# Patient Record
Sex: MALE | Race: WHITE | NOT HISPANIC OR LATINO | URBAN - METROPOLITAN AREA
[De-identification: names, ages, dates, MRNs, and addresses within clinical notes are randomized per-mention and may not be internally consistent; named-entity substitution may affect disease eponyms.]

---

## 2019-01-20 ENCOUNTER — EMERGENCY (EMERGENCY)
Facility: HOSPITAL | Age: 49
LOS: 0 days | Discharge: HOME | End: 2019-01-20
Admitting: PHYSICIAN ASSISTANT

## 2019-01-20 VITALS
SYSTOLIC BLOOD PRESSURE: 138 MMHG | DIASTOLIC BLOOD PRESSURE: 91 MMHG | OXYGEN SATURATION: 97 % | TEMPERATURE: 97 F | HEART RATE: 100 BPM | RESPIRATION RATE: 18 BRPM

## 2019-01-20 DIAGNOSIS — F17.200 NICOTINE DEPENDENCE, UNSPECIFIED, UNCOMPLICATED: ICD-10-CM

## 2019-01-20 DIAGNOSIS — L02.91 CUTANEOUS ABSCESS, UNSPECIFIED: ICD-10-CM

## 2019-01-20 DIAGNOSIS — L98.9 DISORDER OF THE SKIN AND SUBCUTANEOUS TISSUE, UNSPECIFIED: ICD-10-CM

## 2019-01-20 NOTE — ED PROVIDER NOTE - OBJECTIVE STATEMENT
48 year old male smoker presents to the ED with a growth to the left side of his face. Patient initially noticed a mole to the side of his face that he pulled two hairs from. shortly after the area began to grow and swell gradually. Denies fevers, chills, pain, bleeding/drainage from the growth, previous injury or trauma to the area. 48 year old male smoker presents to the ED with a growth to the left side of his face for 8 months. Patient initially noticed a mole to the side of his face that he pulled two hairs from. shortly after the area began to grow and swell gradually. Denies fevers, chills, pain, bleeding/drainage from the growth, previous injury or trauma to the area.

## 2019-01-20 NOTE — ED PROVIDER NOTE - PHYSICAL EXAMINATION
VITALS:  I have reviewed the initial vital signs.  GENERAL: Well-developed, well-nourished, in no acute distress.  HEENT: quarter-sized spherical growth to left face just lateral to left orbit. no fluctuance, induration, erythema, bleeding, or drainage. Sclera clear. Mucous membranes moist.  NECK: supple w FROM. No cervical adenopathy.  CARDIOVASCULAR: RRR, nl S1 and S2. No murmurs, rubs, or gallops.  RESPIRATORY: Normal effort. CTA b/l without wheezes, rales, or rhonchi.  INTEGUMENTARY: Warm, dry. Capillary refill <2 seconds.  NEURO: A&Ox3. Speech clear. Gait normal.

## 2019-01-20 NOTE — ED ADULT NURSE NOTE - NSIMPLEMENTINTERV_GEN_ALL_ED
Implemented All Universal Safety Interventions:  [place] to call system. Call bell, personal items and telephone within reach. Instruct patient to call for assistance. Room bathroom lighting operational. Non-slip footwear when patient is off stretcher. Physically safe environment: no spills, clutter or unnecessary equipment. Stretcher in lowest position, wheels locked, appropriate side rails in place.

## 2019-01-20 NOTE — ED ADULT NURSE NOTE - OBJECTIVE STATEMENT
Pt has a right sided facial bump that he says has been growing over the last 8 months. Bump appear red but remains free from any drainage

## 2019-01-20 NOTE — ED PROVIDER NOTE - PROGRESS NOTE DETAILS
patient seen and evaluated by me. discussed w patient importance of following up with dermatology for further care and evaluation. patient agreeable w plan and understands return precautions. I agree with evaluation and treatment of this patient with DIANE Sagastume.

## 2019-01-20 NOTE — ED PROVIDER NOTE - NS ED ROS FT
CONSTITUTIONAL: (-) fevers, (-) chills  EYES: (-) vision changes, (-) blurry vision, (-) double vision, (-) eye pain, (-) eye redness, (-) eye discharge  RESPIRATORY: (-) cough, (-) sputum  INTEGUMENTARY: (+) growth to left side of face, (-) rashes, (-) ecchymosis  NEURO: (-) headache, (-) head injury

## 2019-01-20 NOTE — ED PROVIDER NOTE - CARE PROVIDER_API CALL
Stephane Lacey), Dermatology; Internal Medicine  [address] [place] [address]  Phone: [phone]  Fax: [phone]

## 2022-07-26 PROBLEM — Z00.00 ENCOUNTER FOR PREVENTIVE HEALTH EXAMINATION: Status: ACTIVE | Noted: 2022-07-26

## 2022-07-28 ENCOUNTER — APPOINTMENT (OUTPATIENT)
Dept: ORTHOPEDIC SURGERY | Facility: CLINIC | Age: 52
End: 2022-07-28

## 2022-08-25 ENCOUNTER — APPOINTMENT (OUTPATIENT)
Age: 52
End: 2022-08-25

## 2022-08-25 ENCOUNTER — NON-APPOINTMENT (OUTPATIENT)
Age: 52
End: 2022-08-25

## 2022-08-25 VITALS
WEIGHT: 145 LBS | BODY MASS INDEX: 19.64 KG/M2 | HEART RATE: 86 BPM | DIASTOLIC BLOOD PRESSURE: 80 MMHG | OXYGEN SATURATION: 97 % | SYSTOLIC BLOOD PRESSURE: 122 MMHG | RESPIRATION RATE: 14 BRPM | HEIGHT: 72 IN

## 2022-08-25 DIAGNOSIS — J44.9 CHRONIC OBSTRUCTIVE PULMONARY DISEASE, UNSPECIFIED: ICD-10-CM

## 2022-08-25 PROCEDURE — 99203 OFFICE O/P NEW LOW 30 MIN: CPT

## 2022-08-25 RX ORDER — ALBUTEROL SULFATE 90 UG/1
108 (90 BASE) INHALANT RESPIRATORY (INHALATION)
Qty: 1 | Refills: 5 | Status: ACTIVE | COMMUNITY
Start: 1900-01-01 | End: 1900-01-01

## 2022-08-26 PROBLEM — J44.9 COPD, MILD: Status: ACTIVE | Noted: 2022-08-26

## 2022-08-26 NOTE — REASON FOR VISIT
[Initial] : an initial visit [Shortness of Breath] : shortness of breath [TextBox_44] : The patient presents for routine evaluation her breathing status.  He has very mild shortness of breath but only heavy exertion.  He is concerned because he smoked 1-1/2 packs a day for about 34 years.  Patient works as a  and is easily able to do his ADLs and only feels short of breath if he really pushes himself beyond his limits.  He rests for a few moments and his symptoms resolved.  He has not been admitted to the hospital for issues with his breathing.

## 2022-08-26 NOTE — ASSESSMENT
[FreeTextEntry1] : I believe the patient likely has an element of mild COPD.\par He needs a pulmonary work-up which will include screening CAT scan of the chest as well as pulmonary function testing.  I gave him a as needed albuterol.  We will follow-up in after the completion of this testing.  He knows he needs to discontinue smoking and this was stressed.\par \par

## 2023-01-09 ENCOUNTER — APPOINTMENT (OUTPATIENT)
Age: 53
End: 2023-01-09